# Patient Record
(demographics unavailable — no encounter records)

---

## 2025-04-03 NOTE — HISTORY OF PRESENT ILLNESS
[FreeTextEntry1] : 77 yo M presents for initial evaluation.  Referred by: Referral Reason:  PMH: PSH: FH: Medications: Allergies: Social history:  Last Colonoscopy: 3/6/2025 with Dr. Baires @ [address] [place] [address] Christelle MONDRAGON: -internal hemorrhoids -4mm polyp found in hepatic flexure, hyperplastic appearing, removed with cold biopsy. -stricture at hepatic flexure, would not allow further insertion of scope, biopsies taken.  Pathology: -colon ascending polyp: TA -colon ascending abnormal mucosa: acellular mucoid debris with rare strip of benign colonic columnar epithelium  -colon ascending abnormal mucosa: colonic mucosa with erosion and granulation tissue   CT A/P: 3/27/2025 -there is a masslike wall thickening in the region of the splenic flexure with evidence of extra serosal extension and adjacent mesenteric lymphadenopathy. this is highly suspicious for an advanced colon cancer. -there are subcentimeter low-attenuation lesions within the liver which most likely represent incidental cysts. given the hepatic flexure mass, metastatic colon cancer, although unlikely, cannot be entirely excluded.  Patient presents for initial evaluation of colon mass.